# Patient Record
Sex: MALE | Race: WHITE | NOT HISPANIC OR LATINO | Employment: FULL TIME | URBAN - METROPOLITAN AREA
[De-identification: names, ages, dates, MRNs, and addresses within clinical notes are randomized per-mention and may not be internally consistent; named-entity substitution may affect disease eponyms.]

---

## 2018-12-19 ENCOUNTER — OCCUPATIONAL MEDICINE (OUTPATIENT)
Dept: URGENT CARE | Facility: PHYSICIAN GROUP | Age: 38
End: 2018-12-19
Payer: COMMERCIAL

## 2018-12-19 VITALS
BODY MASS INDEX: 28.38 KG/M2 | WEIGHT: 224 LBS | OXYGEN SATURATION: 98 % | DIASTOLIC BLOOD PRESSURE: 82 MMHG | RESPIRATION RATE: 16 BRPM | SYSTOLIC BLOOD PRESSURE: 134 MMHG | TEMPERATURE: 98 F | HEART RATE: 64 BPM

## 2018-12-19 DIAGNOSIS — S39.012A LOW BACK STRAIN, INITIAL ENCOUNTER: ICD-10-CM

## 2018-12-19 DIAGNOSIS — M62.830 SPASM OF BACK MUSCLES: ICD-10-CM

## 2018-12-19 PROCEDURE — 99203 OFFICE O/P NEW LOW 30 MIN: CPT | Mod: 29 | Performed by: PHYSICIAN ASSISTANT

## 2018-12-19 RX ORDER — CYCLOBENZAPRINE HCL 10 MG
TABLET ORAL
Qty: 20 TAB | Refills: 0 | Status: SHIPPED | OUTPATIENT
Start: 2018-12-19

## 2018-12-19 ASSESSMENT — ENCOUNTER SYMPTOMS
BACK PAIN: 1
GASTROINTESTINAL NEGATIVE: 1
CONSTITUTIONAL NEGATIVE: 1
NEUROLOGICAL NEGATIVE: 1
RESPIRATORY NEGATIVE: 1
CARDIOVASCULAR NEGATIVE: 1

## 2018-12-19 NOTE — PROGRESS NOTES
Subjective:      Jm Oglesby is a 38 y.o. male who presents with Back Pain (WC lower back injury today,back spasm)      DOI: 12/19/18, 1140.  Lower back.   Patient was bent down to  an empty box and as he was extending upwards he felt a sudden dull pain that has progressed to aching and spasms.  He states he can stand up straight but the pain gets exacerbated by extending the back.  He took about 400 mg of Ibuprofen immediately after with mild improvement.  No numbness, tingling or weakness in the legs.   No bladder or bowel changes.   Patient does not have chronic back issues but does state that he had a similar episode of back spasms that seemed to have no proceeding issue or causation in the Fall that was not work related.  No previous back injuries or surgeries.  No second job.      Back Pain     as above.     Review of Systems   Constitutional: Negative.    Respiratory: Negative.    Cardiovascular: Negative.    Gastrointestinal: Negative.    Genitourinary: Negative.    Musculoskeletal: Positive for back pain.        SEE HPI   Skin: Negative.    Neurological: Negative.        PMH:  has a past medical history of Hypertension.  MEDS:   Current Outpatient Prescriptions:   •  cyclobenzaprine (FLEXERIL) 10 MG Tab, 1/2 -1 tablet at bedtime prn spasms., Disp: 20 Tab, Rfl: 0  ALLERGIES:   Allergies   Allergen Reactions   • Amoxicillin    • Vicodin [Hydrocodone-Acetaminophen] Nausea     Nausea,vomiting,itching     SURGHX:   Past Surgical History:   Procedure Laterality Date   • OTHER      orbital plate r/t trauma /wrist   • OTHER ORTHOPEDIC SURGERY       SOCHX:  reports that he has never smoked. He has never used smokeless tobacco. He reports that he drinks alcohol. He reports that he uses drugs.  FH: Family history was reviewed, no pertinent findings to report   Objective:     /82 (BP Location: Left arm, Patient Position: Sitting, BP Cuff Size: Adult)   Pulse 64   Temp 36.7 °C (98 °F)   Resp  16   Wt 101.6 kg (224 lb)   SpO2 98%   BMI 28.38 kg/m²      Physical Exam   Constitutional: He is oriented to person, place, and time. He appears well-developed and well-nourished. No distress.   Neck: Normal range of motion. Neck supple.   Cardiovascular: Normal rate.    Pulmonary/Chest: Effort normal.   Neurological: He is alert and oriented to person, place, and time.   Skin: Skin is warm and dry. No rash noted.   Psychiatric: He has a normal mood and affect. His behavior is normal.     Vitals reviewed   Lumbar spine: No swelling, ecchymosis or deformity.  No midline TTP.  Mild bilateral paraspinous TTP mid-lower lumbar region, spasm noted b/l.  Decreased ROM of back by 50% in all planes, pain with full flexion and extension. Bilateral lower extremities with 5/5 strength, normal sensation throughout and 2+ DTRs.  Normal gait.          Assessment/Plan:     1. Low back strain, initial encounter     2. Spasm of back muscles  cyclobenzaprine (FLEXERIL) 10 MG Tab       -restrictions as above.   -back care discussed, proper lifting mechanics discussed  -recommend heat/ice prn.  Gentle stretches daily.   -Flexeril if needed for bedtime/spasms. No driving, caution drowsy.  Not to be taken while at work.   -back pain red flags and ER precautions discussed with patient.       Anahi Aragon P.A.-C.

## 2018-12-19 NOTE — LETTER
[facility] [address] [address]  Phone:  [phone] - Fax:  [phone]   [facility] Progress Report and Disability Certification  Date of Service: 12/19/2018   No Show:  No  Date / Time of Next Visit: 12/22/2018   Claim Information   Patient Name: Jm Oglesby  Claim Number:     Employer: [employer]  Date of Injury: 12/19/2018     Insurer / TPA: Mark Dubois  ID / SSN:     Occupation:   Diagnosis: Diagnoses of Low back strain, initial encounter and Spasm of back muscles were pertinent to this visit.    Medical Information   Related to Industrial Injury? Yes    Subjective Complaints:  DOI: 12/19/18, 1140.  Lower back.   Patient was bent down to  an empty box and as he was extending upwards he felt a sudden dull pain that has progressed to aching and spasms.  He states he can stand up straight but the pain gets exacerbated by extending the back.  He took about 400 mg of Ibuprofen immediately after with mild improvement.  No numbness, tingling or weakness in the legs.   No bladder or bowel changes.   Patient does not have chronic back issues but does state that he had a similar episode of back spasms that seemed to have no proceeding issue or causation in the Fall that was not work related.  No previous back injuries or surgeries.  No second job.    Objective Findings: Vitals reviewed   Lumbar spine: No swelling, ecchymosis or deformity.  No midline TTP.  Mild bilateral paraspinous TTP mid-lower lumbar region, spasm noted b/l.  Decreased ROM of back by 50% in all planes, pain with full flexion and extension. Bilateral lower extremities with 5/5 strength, normal sensation throughout and 2+ DTRs.  Normal gait.      Pre-Existing Condition(s):     Assessment:   Initial Visit    Status: Additional Care Required  Permanent Disability:No    Plan:      Diagnostics:      Comments:       Disability  Information   Status: Released to Restricted Duty    From:  2018  Through: 2018 Restrictions are: Temporary   Physical Restrictions   Sitting:    Standing:    Stooping:    Bendin hrs/day   Squatting:    Walking:    Climbin hrs/day Pushing:      Pulling:    Other:    Reaching Above Shoulder (L):   Reaching Above Shoulder (R):       Reaching Below Shoulder (L):    Reaching Below Shoulder (R):      Not to exceed Weight Limits   Carrying(hrs):   Weight Limit(lb): < or = to 10 pounds Lifting(hrs):   Weight  Limit(lb): < or = to 10 pounds   Comments: -restrictions as above.   -back care discussed, proper lifting mechanics discussed  -recommend heat/ice prn.  Gentle stretches daily.   -Flexeril if needed for bedtime/spasms. No driving, caution drowsy.  Not to be taken while at work.   -back pain red flags and ER precautions discussed with patient.     Repetitive Actions   Hands: i.e. Fine Manipulations from Grasping:     Feet: i.e. Operating Foot Controls:     Driving / Operate Machinery:     Physician Name: Rosana Aragon P.A.-C. Physician Signature: ROSANA Monge P.A.-C. e-Signature: Dr. Jonathan Olivas, Medical Director   Clinic Name / Location: [address] [address] Clinic Phone Number: Dept: [phone]   Appointment Time: 1:20 Pm Visit Start Time: 1:38 PM   Check-In Time:  1:32 Pm Visit Discharge Time:  245PM   Original-Treating Physician or Chiropractor    Page 2-Insurer/TPA    Page 3-Employer    Page 4-Employee

## 2018-12-19 NOTE — LETTER
"EMPLOYEE’S CLAIM FOR COMPENSATION/ REPORT OF INITIAL TREATMENT  FORM C-4    EMPLOYEE’S CLAIM - PROVIDE ALL INFORMATION REQUESTED   First Name  Jm Last Name  Mendel Birthdate                    1980                Sex  male Claim Number   Home Address  [address] Age  38 y.o. Height  6'2.5\" Weight  101.6 kg (224 lb) [facility]     [place]  [address] Telephone  [phone] (home)    Mailing Address  [address] [place]  [address] Primary Language Spoken  English    Insurer   Third Party   Mark Corrigan   Employee's Occupation (Job Title) When Injury or Occupational Disease Occurred      Employer's Name  [employer]  Telephone  [phone]    Employer Address  [address]  [address]    Date of Injury  12/19/2018               Hour of Injury  11:40 AM Date Employer Notified  12/19/2018 Last Day of Work after Injury or Occupational Disease   Supervisor to Whom Injury Reported  [facility]   Address or Location of Accident (if applicable)  [[address] [place] [address]]   What were you doing at the time of accident? (if applicable)  Picking up an empty box    How did this injury or occupational disease occur? (Be specific an answer in detail. Use additional sheet if necessary)  I picked up an empty box from the floor   If you believe that you have an occupational disease, when did you first have knowledge of the disability and it relationship to your employment?   Witnesses to the Accident        Nature of Injury or Occupational Disease  Strain  Part(s) of Body Injured or Affected  Lower Back Area (Lumbar Area & Lumbo-Sacral), ,     I certify that the above is true and correct to the best of my knowledge and that I have provided this information in order to obtain the benefits of [place]’s [place] Insurance and " [place]  I hereby authorize any physician, chiropractor, surgeon, practitioner, or other person, any hospital, including [facility] or [facility], any medical service organization, any insurance company, or other institution or organization to release to each other, any medical or other information, including benefits paid or payable, pertinent to this injury or disease, except information relative to diagnosis, treatment and/or counseling for AIDS, psychological conditions, alcohol or controlled substances, for which I must give specific authorization.  A Photostat of this authorization shall be as valid as the original.     Date   Place   Employee’s Signature   THIS REPORT MUST BE COMPLETED AND MAILED WITHIN 3 WORKING DAYS OF TREATMENT   Place  [facility]  Name of Facility  [place]   Date  12/19/2018 Diagnosis  (S39.012A) Low back strain, initial encounter  (M62.830) Spasm of back muscles Is there evidence the injured employee was under the influence of alcohol and/or another controlled substance at the time of accident?   Hour  1:38 PM Description of Injury or Disease  Diagnoses of Low back strain, initial encounter and Spasm of back muscles were pertinent to this visit. No   Treatment  -restrictions as above.   -back care discussed, proper lifting mechanics discussed  -recommend heat/ice prn.  Gentle stretches daily.   -Flexeril if needed for bedtime/spasms. No driving, caution drowsy.  Not to be taken while at work.   -back pain red flags and ER precautions discussed with patient.   Have you advised the patient to remain off work five days or more? No   X-Ray Findings      If Yes   From Date  To Date      From information given by the employee, together with medical evidence, can you directly connect this injury or occupational disease as job incurred?  Yes If No Full Duty  No Modified Duty  Yes    "  Is additional medical care by a physician indicated?  Yes If Modified Duty, Specify any Limitations / Restrictions      Do you know of any previous injury or disease contributing to this condition or occupational disease?                            No   Date  12/19/2018 Print Doctor’s Name Rosana Aragon P.A.-C. I certify the employer’s copy of  this form was mailed on:   Address  [address] Insurer’s Use Only     [facility]  [address]    Provider’s Tax ID Number  [id number] Telephone  Dept: [phone]        e-ROSANA Mcintyre P.A.-C.   e-Signature: Dr. Jonathan Olivas, Medical Director Degree  JOS        ORIGINAL-TREATING PHYSICIAN OR CHIROPRACTOR    PAGE 2-INSURER/TPA    PAGE 3-EMPLOYER    PAGE 4-EMPLOYEE             Form C-4 (rev10/07)              BRIEF DESCRIPTION OF RIGHTS AND BENEFITS  (Pursuant to [place]RS 616C.050)    Notice of Injury or Occupational Disease (Incident Report Form C-1): If an injury or occupational disease (OD) arises out of and in the  course of employment, you must provide written notice to your employer as soon as practicable, but no later than 7 days after the accident or  OD. Your employer shall maintain a sufficient supply of the required forms.    Claim for Compensation (Form C-4): If medical treatment is sought, the form C-4 is available at the place of initial treatment. A completed  \"Claim for Compensation\" (Form C-4) must be filed within 90 days after an accident or OD. The treating physician or chiropractor must,  within 3 working days after treatment, complete and mail to the employer, the employer's insurer and third-party , the Claim for  Compensation.    Medical Treatment: If you require medical treatment for your on-the-job injury or OD, you may be required to select a physician or  chiropractor from a list provided by your workers’ compensation insurer, if it has contracted with an Organization for Managed Care (MCO) " or  Preferred Provider Organization (PPO) or providers of health care. If your employer has not entered into a contract with an MCO or PPO, you  may select a physician or chiropractor from the Panel of Physicians and Chiropractors. Any medical costs related to your industrial injury or  OD will be paid by your insurer.    Temporary Total Disability (TTD): If your doctor has certified that you are unable to work for a period of at least 5 consecutive days, or 5  cumulative days in a 20-day period, or places restrictions on you that your employer does not accommodate, you may be entitled to TTD  compensation.    Temporary Partial Disability (TPD): If the wage you receive upon reemployment is less than the compensation for TTD to which you are  entitled, the insurer may be required to pay you TPD compensation to make up the difference. TPD can only be paid for a maximum of 24  months.    Permanent Partial Disability (PPD): When your medical condition is stable and there is an indication of a PPD as a result of your injury or  OD, within 30 days, your insurer must arrange for an evaluation by a rating physician or chiropractor to determine the degree of your PPD. The  amount of your PPD award depends on the date of injury, the results of the PPD evaluation and your age and wage.    Permanent Total Disability (PTD): If you are medically certified by a treating physician or chiropractor as permanently and totally disabled  and have been granted a PTD status by your insurer, you are entitled to receive monthly benefits not to exceed 66 2/3% of your average  monthly wage. The amount of your PTD payments is subject to reduction if you previously received a PPD award.    Vocational Rehabilitation Services: You may be eligible for vocational rehabilitation services if you are unable to return to the job due to a  permanent physical impairment or permanent restrictions as a result of your injury or occupational  disease.    Transportation and Per Ana Reimbursement: You may be eligible for travel expenses and per ana associated with medical treatment.    Reopening: You may be able to reopen your claim if your condition worsens after claim closure.    Appeal Process: If you disagree with a written determination issued by the insurer or the insurer does not respond to your request, you may  appeal to the Department of Administration, , by following the instructions contained in your determination letter. You must  appeal the determination within 70 days from the date of the determination letter at [address], [address], [place], [place]  [address], or [address], [address], [place], [place] [address]. If you disagree with the  decision, you may appeal to the  Department of Administration, . You must file your appeal within 30 days from the date of the  decision  letter at [address], [address], [place], [place] [address], or [address], [place], [place] [address]. If you  disagree with a decision of an , you may file a petition for judicial review with the District Court. You must do so within 30  days of the Appeal Officer’s decision. You may be represented by an  at your own expense or you may contact the [facility] for possible  representation.    [place]  for Injured Workers (NAIW): If you disagree with a  decision, you may request that NAIW represent you  without charge at an  Hearing. For information regarding denial of benefits, you may contact the [facility] at: [address], [address], [place] [address], [phone], or [address], [place] [address], [phone]    To File a Complaint with the Division: If you wish to file a complaint with the  of the Division of Industrial Relations (DIR),  please contact  the Workers’ Compensation Section, [address], [address], [place], [place] [address], telephone (762) [phone], or  [address], [address], [place], [place] [address], telephone (631) [phone].    For assistance with Workers’ Compensation Issues: you may contact the Office of the [facility] Consumer Health Assistance, [address] [place] [address], [address], [place], [place] [address], Toll Free 1-843.571.6531, Web site: http://govcha.[facility].[place]., E-mail  [email or web address].[place].                                                                                                                                                                                                                                   __________________________________________________________________                                                                   _________________                Employee Name / Signature                                                                                                                                                       Date                                                                                                                                                                                                     D-2 (rev. 10/07)

## 2018-12-22 ENCOUNTER — OCCUPATIONAL MEDICINE (OUTPATIENT)
Dept: URGENT CARE | Facility: PHYSICIAN GROUP | Age: 38
End: 2018-12-22
Payer: COMMERCIAL

## 2018-12-22 VITALS
OXYGEN SATURATION: 98 % | RESPIRATION RATE: 16 BRPM | WEIGHT: 230 LBS | SYSTOLIC BLOOD PRESSURE: 132 MMHG | HEART RATE: 79 BPM | BODY MASS INDEX: 29.14 KG/M2 | DIASTOLIC BLOOD PRESSURE: 84 MMHG | TEMPERATURE: 98.2 F

## 2018-12-22 DIAGNOSIS — S39.012D STRAIN OF LUMBAR REGION, SUBSEQUENT ENCOUNTER: ICD-10-CM

## 2018-12-22 PROCEDURE — 99213 OFFICE O/P EST LOW 20 MIN: CPT | Performed by: FAMILY MEDICINE

## 2018-12-22 ASSESSMENT — ENCOUNTER SYMPTOMS
NECK PAIN: 0
SENSORY CHANGE: 0
FOCAL WEAKNESS: 0
FLANK PAIN: 0

## 2018-12-22 NOTE — LETTER
[facility] [address] [address]  Phone:  [phone] - Fax:  [phone]   [facility] Progress Report and Disability Certification  Date of Service: 12/22/2018   No Show:  No  Date / Time of Next Visit: 12/25/2018   Claim Information   Patient Name: Jm Oglesby  Claim Number:     Employer: [employer]  Date of Injury: 12/19/2018     Insurer / TPA: Mark Desdemona  ID / SSN:     Occupation:   Diagnosis: The encounter diagnosis was Strain of lumbar region, subsequent encounter.    Medical Information   Related to Industrial Injury? Yes    Subjective Complaints:  DOI 12/19/2018  F/u lumbar strain. Improving and feels 80% better. He continues to use flexeril at night and prn nsaid/heat but still has some stiffness in low back bilateral. No radiation. No myelopathy. No other aggravating or alleviating factors.     Objective Findings: Back: no point bony tenderness or stepoff, mild paralumbar muscle tenderness. Range of motion intact.   Neuro: Bilateral lower extremity strength and sensory intact.  Negative straight leg raise.  DTR 2+/4 equal at knees   Pre-Existing Condition(s):     Assessment:   Condition Improved    Status: Additional Care Required  Permanent Disability:No    Plan:   Comments:advance work restrictions, otc nsaid as needed    Diagnostics:      Comments:       Disability Information   Status: Released to Restricted Duty    From:  12/22/2018  Through: 12/25/2018 Restrictions are:     Physical Restrictions   Sitting:    Standing:    Stooping:  < or = to 2 hrs/day Bending:  < or = to 2 hrs/day   Squatting:    Walking:    Climbing:    Pushing:  < or = to 4 hrs/day   Pulling:  < or = to 4 hrs/day Other:    Reaching Above Shoulder (L):   Reaching Above Shoulder (R):       Reaching Below Shoulder (L):    Reaching Below Shoulder (R):      Not to exceed Weight Limits   Carrying(hrs): 4 Weight Limit(lb):    Comments:20 Lifting(hrs): 4 Weight  Limit(lb):   Comments:20   Comments:      Repetitive Actions   Hands: i.e. Fine Manipulations from Grasping:     Feet: i.e. Operating Foot Controls:     Driving / Operate Machinery:     Physician Name: Félix Pfeiffer M.D. Physician Signature: FÉLIX Leavitt M.D. e-Signature: Dr. Jonathan Olivas, Medical Director   Clinic Name / Location: [address] [address] Clinic Phone Number: Dept: [phone]   Appointment Time: 9:20 Am Visit Start Time: 9:30 AM   Check-In Time:  9:27 Am Visit Discharge Time:  9:54 AM   Original-Treating Physician or Chiropractor    Page 2-Insurer/TPA    Page 3-Employer    Page 4-Employee

## 2018-12-22 NOTE — PROGRESS NOTES
Subjective:      Jm Oglesby is a 38 y.o. male who presents with Back Pain (work comp follow up)      DOI 12/19/2018  F/u lumbar strain. Improving and feels 80% better. He continues to use flexeril at night and prn nsaid/heat but still has some stiffness in low back bilateral. No radiation. No myelopathy. No other aggravating or alleviating factors.       HPI    Review of Systems   Genitourinary: Negative for flank pain and hematuria.   Musculoskeletal: Negative for neck pain.   Skin: Negative for rash.   Neurological: Negative for sensory change and focal weakness.          Objective:     /84   Pulse 79   Temp 36.8 °C (98.2 °F)   Resp 16   Wt 104.3 kg (230 lb)   SpO2 98%   BMI 29.14 kg/m²      Physical Exam   Constitutional: He appears well-developed and well-nourished. No distress.   HENT:   Head: Normocephalic and atraumatic.   Skin: Skin is warm and dry. No rash noted.       Back: no point bony tenderness or stepoff, mild paralumbar muscle tenderness. Range of motion intact.   Neuro: Bilateral lower extremity strength and sensory intact.  Negative straight leg raise.  DTR 2+/4 equal at knees       Assessment/Plan:     1. Strain of lumbar region, subsequent encounter       Differential diagnosis, natural history, supportive care, and indications for immediate follow-up discussed at length.

## 2018-12-25 ENCOUNTER — OCCUPATIONAL MEDICINE (OUTPATIENT)
Dept: URGENT CARE | Facility: PHYSICIAN GROUP | Age: 38
End: 2018-12-25
Payer: COMMERCIAL

## 2018-12-25 VITALS
HEART RATE: 60 BPM | OXYGEN SATURATION: 100 % | SYSTOLIC BLOOD PRESSURE: 140 MMHG | BODY MASS INDEX: 29.9 KG/M2 | DIASTOLIC BLOOD PRESSURE: 88 MMHG | WEIGHT: 236 LBS | RESPIRATION RATE: 20 BRPM | TEMPERATURE: 98 F

## 2018-12-25 DIAGNOSIS — S39.012D STRAIN OF LUMBAR REGION, SUBSEQUENT ENCOUNTER: ICD-10-CM

## 2018-12-25 PROCEDURE — 99213 OFFICE O/P EST LOW 20 MIN: CPT | Mod: 29 | Performed by: NURSE PRACTITIONER

## 2018-12-25 ASSESSMENT — ENCOUNTER SYMPTOMS
ABDOMINAL PAIN: 0
FOCAL WEAKNESS: 0
SENSORY CHANGE: 0
BACK PAIN: 1
TINGLING: 0

## 2018-12-25 NOTE — PROGRESS NOTES
Subjective:      Jm Oglesby is a 38 y.o. male who presents with Low Back Pain ([facility], muscle strain )            HPI DOI: 12/19/18. Patient is here for follow up on low back strain. Today, patient states mostly stiff with mild pain with extreme range of motion. Still taking ibuprofen and flexeril. Aggravating: sudden movements. Alleviating : standing, stretching.  Amoxicillin and Vicodin [hydrocodone-acetaminophen]  Current Outpatient Prescriptions on File Prior to Visit   Medication Sig Dispense Refill   • cyclobenzaprine (FLEXERIL) 10 MG Tab 1/2 -1 tablet at bedtime prn spasms. 20 Tab 0     No current facility-administered medications on file prior to visit.      Social History     Social History   • Marital status: Single     Spouse name: N/A   • Number of children: N/A   • Years of education: N/A     Occupational History   • Not on file.     Social History Main Topics   • Smoking status: Never Smoker   • Smokeless tobacco: Never Used   • Alcohol use Yes      Comment: occ   • Drug use: Yes      Comment: rare Marijuana   • Sexual activity: Not on file     Other Topics Concern   • Not on file     Social History Narrative   • No narrative on file     family history is not on file.      Review of Systems   Gastrointestinal: Negative for abdominal pain.   Musculoskeletal: Positive for back pain.   Neurological: Negative for tingling, sensory change and focal weakness.          Objective:     /88   Pulse 60   Temp 36.7 °C (98 °F)   Resp 20   Wt 107 kg (236 lb)   SpO2 100%   BMI 29.90 kg/m²      Physical Exam   Constitutional: He appears well-developed and well-nourished. No distress.   Cardiovascular: Normal rate, regular rhythm and normal heart sounds.    No murmur heard.  Pulmonary/Chest: Effort normal and breath sounds normal.   Musculoskeletal:        Lumbar back: He exhibits tenderness and pain. He exhibits normal range of motion, no swelling and no spasm.   Neurological: He is alert. No  sensory deficit. He exhibits normal muscle tone. Gait normal.               Assessment/Plan:     1. Strain of lumbar region, subsequent encounter       Improving slowly.  Continue plan of care.  Follow up on Saturday.

## 2018-12-25 NOTE — LETTER
[facility] [address] [address]  Phone:  [phone] - Fax:  [phone]   [facility] Progress Report and Disability Certification  Date of Service: 12/25/2018   No Show:  No  Date / Time of Next Visit: 12/29/2018   Claim Information   Patient Name: Jm Oglesby  Claim Number:     Employer: [employer]  Date of Injury: 12/19/2018     Insurer / TPA: [employer] [place]  ID / SSN:     Occupation:   Diagnosis: The encounter diagnosis was Strain of lumbar region, subsequent encounter.    Medical Information   Related to Industrial Injury? Yes    Subjective Complaints:  DOI: 12/19/18. Patient is here for follow up on low back strain. Today, patient states mostly stiff with mild pain with extreme range of motion. Still taking ibuprofen and flexeril. Aggravating: sudden movements. Alleviating : standing, stretching.   Objective Findings: Physical Exam   Constitutional: He appears well-developed and well-nourished. No distress.   Cardiovascular: Normal rate, regular rhythm and normal heart sounds.    No murmur heard.  Pulmonary/Chest: Effort normal and breath sounds normal.   Musculoskeletal:        Lumbar back: He exhibits tenderness and pain. He exhibits normal range of motion, no swelling and no spasm.   Neurological: He is alert. No sensory deficit. He exhibits normal muscle tone. Gait normal.      Pre-Existing Condition(s):     Assessment:   Condition Improved    Status: Additional Care Required  Permanent Disability:No    Plan:      Diagnostics:      Comments:       Disability Information   Status: Released to Restricted Duty    From:  12/25/2018  Through: 12/29/2018 Restrictions are: Temporary   Physical Restrictions   Sitting:    Standing:    Stooping:  < or = to 4 hrs/day Bending:  < or = to 4 hrs/day   Squatting:  < or = to 4 hrs/day Walking:    Climbing:    Pushing:      Pulling:    Other:    Reaching Above  Shoulder (L):   Reaching Above Shoulder (R):       Reaching Below Shoulder (L):    Reaching Below Shoulder (R):      Not to exceed Weight Limits   Carrying(hrs):   Weight Limit(lb): < or = to 25 pounds Lifting(hrs):   Weight  Limit(lb): < or = to 25 pounds   Comments: F/u. 12/29/18.    Repetitive Actions   Hands: i.e. Fine Manipulations from Grasping:     Feet: i.e. Operating Foot Controls:     Driving / Operate Machinery:     Physician Name: ARISTIDES GalindoPLUCY Physician Signature: MICHAEL Canchola e-Signature: Dr. Jonathan Olivas, Medical Director   Clinic Name / Location: [address] [address] Clinic Phone Number: Dept: [phone]   Appointment Time: 9:20 Am Visit Start Time: 9:24 AM   Check-In Time:  9:12 Am Visit Discharge Time:  9:41AM   Original-Treating Physician or Chiropractor    Page 2-Insurer/TPA    Page 3-Employer    Page 4-Employee

## 2018-12-29 ENCOUNTER — OCCUPATIONAL MEDICINE (OUTPATIENT)
Dept: URGENT CARE | Facility: PHYSICIAN GROUP | Age: 38
End: 2018-12-29
Payer: COMMERCIAL

## 2018-12-29 VITALS
HEART RATE: 76 BPM | OXYGEN SATURATION: 99 % | RESPIRATION RATE: 18 BRPM | HEIGHT: 74 IN | TEMPERATURE: 99.3 F | BODY MASS INDEX: 28.75 KG/M2 | DIASTOLIC BLOOD PRESSURE: 76 MMHG | WEIGHT: 224 LBS | SYSTOLIC BLOOD PRESSURE: 142 MMHG

## 2018-12-29 DIAGNOSIS — S39.012D STRAIN OF LUMBAR REGION, SUBSEQUENT ENCOUNTER: ICD-10-CM

## 2018-12-29 PROCEDURE — 99213 OFFICE O/P EST LOW 20 MIN: CPT | Mod: 29 | Performed by: NURSE PRACTITIONER

## 2018-12-29 ASSESSMENT — ENCOUNTER SYMPTOMS
NEUROLOGICAL NEGATIVE: 1
CONSTITUTIONAL NEGATIVE: 1
PSYCHIATRIC NEGATIVE: 1
CARDIOVASCULAR NEGATIVE: 1
BACK PAIN: 1
GASTROINTESTINAL NEGATIVE: 1
RESPIRATORY NEGATIVE: 1

## 2018-12-29 NOTE — PROGRESS NOTES
"Subjective:      Jm Oglesby is a 38 y.o. male who presents with Work-Related Injury (lower back injury)    HPI  HPI DOI: 12/19/18  Patient developed low back pain after bending over to  a light box, he immediately developed pain to his low back, bilateral.  Patient is here for follow up on low back strain. Today, patient states mostly stiff with mild pain with extreme range of motion. Still taking ibuprofen and flexeril. Aggravating: sudden movements. Alleviating : standing, stretching. He has been tolerating work restrictions well. He slowly admits to improvement but does not feel he is ready to return fully to work.     Past Medical History:   Diagnosis Date   • Hypertension     Borderline no meds     Past Surgical History:   Procedure Laterality Date   • OTHER      orbital plate r/t trauma /wrist   • OTHER ORTHOPEDIC SURGERY       Current Outpatient Prescriptions on File Prior to Visit   Medication Sig Dispense Refill   • cyclobenzaprine (FLEXERIL) 10 MG Tab 1/2 -1 tablet at bedtime prn spasms. 20 Tab 0     No current facility-administered medications on file prior to visit.      ALL: Amoxicillin and Vicodin [hydrocodone-acetaminophen]    Review of Systems   Constitutional: Negative.    HENT: Negative.    Respiratory: Negative.    Cardiovascular: Negative.    Gastrointestinal: Negative.    Genitourinary: Negative.    Musculoskeletal: Positive for back pain.   Skin: Negative.    Neurological: Negative.    Psychiatric/Behavioral: Negative.           Objective:     /76 (BP Location: Right arm, Patient Position: Sitting, BP Cuff Size: Adult)   Pulse 76   Temp 37.4 °C (99.3 °F) (Temporal)   Resp 18   Ht 1.88 m (6' 2\")   Wt 101.6 kg (224 lb)   SpO2 99%   BMI 28.76 kg/m²      Physical Exam   Constitutional: He is oriented to person, place, and time. Vital signs are normal. He appears well-developed and well-nourished. He is active. He does not have a sickly appearance. He does not appear " ill. No distress.   HENT:   Head: Normocephalic and atraumatic.   Right Ear: External ear normal.   Left Ear: External ear normal.   Nose: Nose normal.   Mouth/Throat: Oropharynx is clear and moist.   Eyes: Pupils are equal, round, and reactive to light. Conjunctivae are normal. Right eye exhibits no discharge. Left eye exhibits no discharge. No scleral icterus.   Neck: Normal range of motion. Neck supple. No JVD present. No tracheal deviation present.   Cardiovascular: Normal rate, regular rhythm, normal heart sounds and intact distal pulses.    Pulmonary/Chest: Effort normal and breath sounds normal. No stridor. No respiratory distress. He has no wheezes.   Musculoskeletal: Normal range of motion. He exhibits no edema, tenderness or deformity.        Cervical back: Normal.        Thoracic back: Normal.        Lumbar back: Normal.   Lymphadenopathy:     He has no cervical adenopathy.   Neurological: He is alert and oriented to person, place, and time. He has normal strength and normal reflexes. He displays normal reflexes. No cranial nerve deficit or sensory deficit. He exhibits normal muscle tone. Coordination and gait normal. GCS eye subscore is 4. GCS verbal subscore is 5. GCS motor subscore is 6.   Skin: Skin is warm, dry and intact. Capillary refill takes less than 2 seconds. No abrasion, no bruising, no ecchymosis, no laceration and no rash noted. He is not diaphoretic. No erythema. No pallor.   Psychiatric: He has a normal mood and affect. His behavior is normal. Judgment and thought content normal.   Vitals reviewed.      A/Ox4. NAD. Gait is steady. There is no spinal tenderness, spine has FROM. Patellar DTRs +2 bilaterally, strength 5/5 with upper and lower extremities. N/V intact.        Assessment/Plan:     1. Strain of lumbar region, subsequent encounter      Patient appears to be healing well, OTC NSAIDS, Flexeril (not at work), ice and heat therapy, work restrictions (discussed full duty trial during  next visit), RTC 1/2/18. Supportive care, differential diagnoses, and indications for immediate follow-up discussed with patient.   Pathogenesis of diagnosis discussed including typical length and natural progression.   Instructed to return to clinic or nearest emergency department sooner for any change in condition, further concerns, or worsening of symptoms.  Patient states understanding of the plan of care and discharge instructions.          ARACELI Murguia.

## 2018-12-29 NOTE — LETTER
[facility] [address] [place] [address]  Phone:  [phone] - Fax:  [phone]   [facility] Progress Report and Disability Certification  Date of Service: 2018   No Show:  No  Date / Time of Next Visit: 2019   Claim Information   Patient Name: Jm Oglesby  Claim Number:     Employer: [employer]  Date of Injury: 2018     Insurer / TPA: Mark [place]  ID / SSN:     Occupation:   Diagnosis: The encounter diagnosis was Strain of lumbar region, subsequent encounter.    Medical Information   Related to Industrial Injury? Yes    Subjective Complaints:  HPI DOI: 18  Patient developed low back pain after bending over to  a light box, he immediately developed pain to his low back, bilateral.  Patient is here for follow up on low back strain. Today, patient states mostly stiff with mild pain with extreme range of motion. Still taking ibuprofen and flexeril. Aggravating: sudden movements. Alleviating : standing, stretching. He has been tolerating work restrictions well. He slowly admits to improvement but does not feel he is ready to return fully to work.    Objective Findings: A/Ox4. NAD. Gait is steady. There is no spinal tenderness, spine has FROM. Patellar DTRs +2 bilaterally, strength 5/5 with upper and lower extremities. N/V intact.    Pre-Existing Condition(s):     Assessment:   Condition Improved    Status: Additional Care Required  Permanent Disability:No    Plan: MedicationMedication (NOT at Work)  Comments:OTC NSAIDS, Flexeril (not at work), ice and heat therapy, work restrictions, RTC 18     Diagnostics:   Comments:N/A    Comments:       Disability Information   Status: Released to Restricted Duty    From:  2018  Through: 2019 Restrictions are: Temporary   Physical Restrictions   Sitting:    Standing:  < or = to 6 hrs/day Stooping:    Bendin hrs/day    Squattin hrs/day Walking:    Climbing:    Pushing:  < or = to 2 hrs/day   Pulling:  < or = to 2 hrs/day Other:    Reaching Above Shoulder (L):   Reaching Above Shoulder (R):       Reaching Below Shoulder (L):    Reaching Below Shoulder (R):      Not to exceed Weight Limits   Carrying(hrs):   Weight Limit(lb): < or = to 25 pounds Lifting(hrs):   Weight  Limit(lb): < or = to 25 pounds   Comments:      Repetitive Actions   Hands: i.e. Fine Manipulations from Grasping:     Feet: i.e. Operating Foot Controls:     Driving / Operate Machinery:     Physician Name: JUAN J Murguia Physician Signature: SALINAS Dey e-Signature: Dr. Jonathan Olivas, Medical Director   Clinic Name / Location: [address] [address] Clinic Phone Number: Dept: [phone]   Appointment Time: 9:20 Am Visit Start Time: 9:16 AM   Check-In Time:  9:07 Am Visit Discharge Time: 9:41 AM   Original-Treating Physician or Chiropractor    Page 2-Insurer/TPA    Page 3-Employer    Page 4-Employee

## 2019-01-02 ENCOUNTER — OCCUPATIONAL MEDICINE (OUTPATIENT)
Dept: URGENT CARE | Facility: PHYSICIAN GROUP | Age: 39
End: 2019-01-02
Payer: COMMERCIAL

## 2019-01-02 ENCOUNTER — HOSPITAL ENCOUNTER (OUTPATIENT)
Dept: RADIOLOGY | Facility: MEDICAL CENTER | Age: 39
End: 2019-01-02
Attending: FAMILY MEDICINE
Payer: COMMERCIAL

## 2019-01-02 VITALS
SYSTOLIC BLOOD PRESSURE: 124 MMHG | RESPIRATION RATE: 16 BRPM | TEMPERATURE: 98.2 F | WEIGHT: 237 LBS | BODY MASS INDEX: 30.42 KG/M2 | DIASTOLIC BLOOD PRESSURE: 66 MMHG | OXYGEN SATURATION: 100 % | HEART RATE: 75 BPM | HEIGHT: 74 IN

## 2019-01-02 DIAGNOSIS — S39.012A LOW BACK STRAIN, INITIAL ENCOUNTER: ICD-10-CM

## 2019-01-02 PROCEDURE — 99214 OFFICE O/P EST MOD 30 MIN: CPT | Performed by: FAMILY MEDICINE

## 2019-01-02 PROCEDURE — 72100 X-RAY EXAM L-S SPINE 2/3 VWS: CPT

## 2019-01-02 RX ORDER — CYCLOBENZAPRINE HCL 10 MG
10 TABLET ORAL NIGHTLY PRN
Qty: 20 TAB | Refills: 0 | Status: SHIPPED | OUTPATIENT
Start: 2019-01-02

## 2019-01-02 NOTE — PROGRESS NOTES
"Subjective:      Jm Oglesby is a 38 y.o. male who presents with Back Injury (/[facility] low back injury)      DOI: 12/19/2018  He si here for follow up. Feels better but continue to feel tight in the lower back, no paresthesias or muscle weakness. Still taking Cyclobenzaprine at night. Working 8 hours but continues to have light duty. Taking ibuprofen 400 dieter 6 hours as needed but has not taken any in the past 2 days   He has only a few muscle relaxer left     HPI    ROS       Objective:     /66 (BP Location: Left arm, Patient Position: Sitting, BP Cuff Size: Adult)   Pulse 75   Temp 36.8 °C (98.2 °F) (Temporal)   Resp 16   Ht 1.88 m (6' 2\")   Wt 107.5 kg (237 lb)   SpO2 100%   BMI 30.43 kg/m²      Physical Exam    Physical Exam   Constitutional: He is oriented to person, place, and time. He appears well-developed and well-nourished. No distress.   Cardiovascular: Normal rate.    Pulmonary/Chest: Effort normal. No respiratory distress.   Musculoskeletal:        Lumbar back: He exhibits decreased range of motion (decrease in extension) and tenderness (tenderness in the paraspina;l region in the lower lumbar area). He exhibits no swelling, no edema, no deformity and normal pulse.   Neurological: He is alert and oriented to person, place, and time. He has normal strength. He displays normal reflexes.   Negative SLR bilat   Skin: He is not diaphoretic.     Lumbar x-ray negative for acute abnormalities on my read       Assessment/Plan:     1. Low back strain, initial encounter  - DX-LUMBAR SPINE-2 OR 3 VIEWS; Future  - REFERRAL TO PHYSICAL THERAPY Reason for Therapy: Eval/Treat/Report  - REFERRAL TO OCCUPATIONAL MEDICINE  - cyclobenzaprine (FLEXERIL) 10 MG Tab; Take 1 Tab by mouth at bedtime as needed.  Dispense: 20 Tab; Refill: 0    Plan to refill the muscle relaxer for as needed at night use  We discussed the proper dose for ibuprofen which is 600-800 mg every 8 hours as needed for pain  Continue " work restrictions  Referral to physical therapy  Also referral to occupational health but we discussed that this may take 2-4 weeks to get in  Patient was advised to follow-up here by latest January 12, unless he is able to see occupational health in which case we are transferring care to them

## 2019-01-02 NOTE — PROGRESS NOTES
Physical Exam   Constitutional: He is oriented to person, place, and time. He appears well-developed and well-nourished. No distress.   Cardiovascular: Normal rate.    Pulmonary/Chest: Effort normal. No respiratory distress.   Musculoskeletal:        Lumbar back: He exhibits decreased range of motion (decrease in extension) and tenderness (tenderness in the paraspina;l region in the lower lumbar area). He exhibits no swelling, no edema, no deformity and normal pulse.        Back:    Neurological: He is alert and oriented to person, place, and time. He has normal strength. He displays normal reflexes.   Negative SLR bilat   Skin: He is not diaphoretic.

## 2019-01-02 NOTE — LETTER
[facility] [address] [address]  Phone:  [phone] - Fax:  [phone]   [facility] Progress Report and Disability Certification  Date of Service: 1/2/2019   No Show:  No  Date / Time of Next Visit: 1/12/2019   Claim Information   Patient Name: Jm Oglesby  Claim Number:     Employer: [employer]  Date of Injury: 12/19/2018     Insurer / TPA: Mark [place]  ID / SSN:     Occupation:   Diagnosis: The encounter diagnosis was Low back strain, initial encounter.    Medical Information   Related to Industrial Injury? Yes    Subjective Complaints:  DOI: 12/19/2018  He si here for follow up. Feels better but continue to feel tight in the lower back, no paresthesias or muscle weakness. Still taking Cyclobenzaprine at night. Working 8 hours but continues to have light duty. Taking ibuprofen 400 dieter 6 hours as needed but has not taken any in the past 2 days   Objective Findings: Physical Exam   Constitutional: He is oriented to person, place, and time. He appears well-developed and well-nourished. No distress.   Cardiovascular: Normal rate.    Pulmonary/Chest: Effort normal. No respiratory distress.   Musculoskeletal:        Lumbar back: He exhibits decreased range of motion (decrease in extension) and tenderness (tenderness in the paraspina;l region in the lower lumbar area). He exhibits no swelling, no edema, no deformity and normal pulse.   Neurological: He is alert and oriented to person, place, and time. He has normal strength. He displays normal reflexes.   Negative SLR bilat   Skin: He is not diaphoretic.      Pre-Existing Condition(s):     Assessment:   Initial Visit    Status: Additional Care Required  Permanent Disability:No    Plan: PTTransfer Care  Comments:usew ibuprofen 600-800mg every 8 hours as needed for pain    Diagnostics: X-ray    Comments:       Disability Information   Status: Released to  Restricted Duty    From:  1/2/2019  Through: 1/12/2019 Restrictions are: Temporary   Physical Restrictions   Sitting:    Standing:    Stooping:    Bending:      Squatting:    Walking:    Climbing:    Pushing:      Pulling:    Other:    Reaching Above Shoulder (L):   Reaching Above Shoulder (R):       Reaching Below Shoulder (L):    Reaching Below Shoulder (R):      Not to exceed Weight Limits   Carrying(hrs):   Weight Limit(lb):   Lifting(hrs):   Weight  Limit(lb):     Comments: No lifting more than 5 lbs  Continue restriction  Use Ibuprofen as needed  Muscle relaxer only at night if needed  Referral to Physical therapy  Also referral to Occupational Health- May take 2-4 weeks    Repetitive Actions   Hands: i.e. Fine Manipulations from Grasping:     Feet: i.e. Operating Foot Controls:     Driving / Operate Machinery:     Physician Name: Jenn Cm M.D. Physician Signature: JENN Green M.D. e-Signature: Dr. Jonathan Olivas, Medical Director   Clinic Name / Location: [address] [address] Clinic Phone Number: Dept: [phone]   Appointment Time: 9:00 Am Visit Start Time: 9:11 AM   Check-In Time:  9:01 Am Visit Discharge Time:  10:12 AM   Original-Treating Physician or Chiropractor    Page 2-Insurer/TPA    Page 3-Employer    Page 4-Employee

## 2019-01-11 ENCOUNTER — OCCUPATIONAL MEDICINE (OUTPATIENT)
Dept: OCCUPATIONAL MEDICINE | Facility: CLINIC | Age: 39
End: 2019-01-11
Payer: COMMERCIAL

## 2019-01-11 VITALS
TEMPERATURE: 98.2 F | BODY MASS INDEX: 30.03 KG/M2 | OXYGEN SATURATION: 100 % | DIASTOLIC BLOOD PRESSURE: 86 MMHG | HEIGHT: 74 IN | SYSTOLIC BLOOD PRESSURE: 134 MMHG | WEIGHT: 234 LBS | HEART RATE: 82 BPM | RESPIRATION RATE: 16 BRPM

## 2019-01-11 DIAGNOSIS — S39.012D STRAIN OF LUMBAR REGION, SUBSEQUENT ENCOUNTER: ICD-10-CM

## 2019-01-11 PROCEDURE — 99203 OFFICE O/P NEW LOW 30 MIN: CPT | Performed by: PREVENTIVE MEDICINE

## 2019-01-11 NOTE — PROGRESS NOTES
"Subjective:      Jm Oglesby is a 38 y.o. male who presents with Follow-Up (WC DOI: 12/19/18 back -same-)      DOI: 12/19/18: 39 yo male presents for lower back pain.   Patient was bent down to  an empty box and as he was extending upwards he felt a sudden dull pain in the low back.  He was seen in urgent care several times, given NSAIDs, muscle relaxers, work restrictions and referral to physical therapy.  Patient states that overall lower back pain is about the same as last visit.  He denies radiating pain, numbness, tingling.  Denies prior major back injuries.  Taking ibuprofen and Flexeril for relief she does seem to help somewhat.  Pain worse with bending and twisting.     HPI    ROS  ROS: All systems were reviewed on intake form, form was reviewed and signed. See scanned documents in media. Pertinent positives and negatives included in HPI.    PMH: No pertinent past medical history to this problem  MEDS: Medications were reviewed in Epic  ALLERGIES:   Allergies   Allergen Reactions   • Amoxicillin    • Vicodin [Hydrocodone-Acetaminophen] Nausea     Nausea,vomiting,itching     SOCHX: Works as front ed assistant at [employer]  FH: No pertinent family history to this problem     Objective:     /86 (BP Location: Left arm, Patient Position: Sitting)   Pulse 82   Temp 36.8 °C (98.2 °F) (Temporal)   Resp 16   Ht 1.88 m (6' 2\")   Wt 106.1 kg (234 lb)   SpO2 100%   BMI 30.04 kg/m²      Physical Exam    Lumbar: No gross deformity.  Diffuse tenderness to palpation bilateral paraspinal musculature.  Reflexes intact.  Straight leg test negative.  Strength intact.       Assessment/Plan:     1. Strain of lumbar region, subsequent encounter  Begin physical therapy   Continue ibuprofen and Flexeril as prescribed   Restricted duty   Follow-up 3 weeks      "

## 2019-01-11 NOTE — LETTER
[address],   [address] [address]  Phone:  [phone] - Fax:  [phone]   [facility] Progress Report and Disability Certification  Date of Service: 1/11/2019   No Show:  No  Date / Time of Next Visit: 2/5/2019@9:55AM   Claim Information   Patient Name: Jm Oglesby  Claim Number:     Employer: [employer]  Date of Injury: 12/19/2018     Insurer / TPA: Mark [place]  ID / SSN:     Occupation:   Diagnosis: The encounter diagnosis was Strain of lumbar region, subsequent encounter.    Medical Information   Related to Industrial Injury? Yes    Subjective Complaints:  DOI: 12/19/18: 39 yo male presents for lower back pain.   Patient was bent down to  an empty box and as he was extending upwards he felt a sudden dull pain in the low back.  He was seen in urgent care several times, given NSAIDs, muscle relaxers, work restrictions and referral to physical therapy.  Patient states that overall lower back pain is about the same as last visit.  He denies radiating pain, numbness, tingling.  Denies prior major back injuries.  Taking ibuprofen and Flexeril for relief she does seem to help somewhat.  Pain worse with bending and twisting.   Objective Findings: Lumbar: No gross deformity.  Diffuse tenderness to palpation bilateral paraspinal musculature.  Reflexes intact.  Straight leg test negative.  Strength intact.   Pre-Existing Condition(s):     Assessment:   Condition Same    Status: Additional Care Required  Permanent Disability:No    Plan:      Diagnostics:      Comments:  Begin physical therapy  Continue ibuprofen and Flexeril as prescribed  Restricted duty  Follow-up 3 weeks    Disability Information   Status: Released to Restricted Duty    From:  1/11/2019  Through: 2/5/2019 Restrictions are: Temporary   Physical Restrictions   Sitting:    Standing:    Stooping:  < or = to 1 hr/day Bending:  < or  = to 1 hr/day   Squatting:    Walking:    Climbing:    Pushing:      Pulling:    Other:    Reaching Above Shoulder (L):   Reaching Above Shoulder (R):       Reaching Below Shoulder (L):    Reaching Below Shoulder (R):      Not to exceed Weight Limits   Carrying(hrs):   Weight Limit(lb): < or = to 10 pounds Lifting(hrs):   Weight  Limit(lb): < or = to 10 pounds   Comments:      Repetitive Actions   Hands: i.e. Fine Manipulations from Grasping:     Feet: i.e. Operating Foot Controls:     Driving / Operate Machinery:     Physician Name: Edy De Luna D.O. Physician Signature: EDY Nation D.O. e-Signature: Dr. Jonathan Olivas, Medical Director   Clinic Name / Location: [address],   [address] [address] Clinic Phone Number: Dept: [phone]   Appointment Time: 8:30 Am Visit Start Time: 8:17 AM   Check-In Time:  8:15 Am Visit Discharge Time:  8:47AM   Original-Treating Physician or Chiropractor    Page 2-Insurer/TPA    Page 3-Employer    Page 4-Employee

## 2019-01-16 ENCOUNTER — PHYSICAL THERAPY (OUTPATIENT)
Dept: PHYSICAL THERAPY | Facility: REHABILITATION | Age: 39
End: 2019-01-16
Attending: FAMILY MEDICINE
Payer: COMMERCIAL

## 2019-01-16 DIAGNOSIS — S39.012A LOW BACK STRAIN, INITIAL ENCOUNTER: ICD-10-CM

## 2019-01-16 PROCEDURE — 97161 PT EVAL LOW COMPLEX 20 MIN: CPT

## 2019-01-16 PROCEDURE — 97012 MECHANICAL TRACTION THERAPY: CPT

## 2019-01-16 ASSESSMENT — ENCOUNTER SYMPTOMS
QUALITY: ACHING
PAIN SCALE: 3
PAIN SCALE AT HIGHEST: 7
PAIN SCALE AT LOWEST: 2
QUALITY: TIGHTNESS
QUALITY: DIFFUSE

## 2019-01-16 NOTE — OP THERAPY EVALUATION
Outpatient Physical Therapy  INITIAL EVALUATION    [facility] Physical Therapy [address].  [address]  [place] [address]  Phone:  [phone]  Fax:  [phone]    Date of Evaluation: 2019    Patient: Jm Oglesby  YOB: 1980  MRN: [id number]     Referring Provider: Jenn Cm M.D.  [address]  [address]  [place] [address]   Referring Diagnosis Low back strain, initial encounter [S39.012A]     Time Calculation  Start time: 1015  Stop time: 1115 Time Calculation (min): 60 minutes     Physical Therapy Occurrence Codes    Date of onset of impairment:  18   Date physical therapy care plan established or reviewed:  19   Date physical therapy treatment started:  19          Chief Complaint: Back Problem and Back Injury    Visit Diagnoses     ICD-10-CM   1. Low back strain, initial encounter S39.012A         Subjective   History of Present Illness:     Date of onset:  2018    History of chief complaint:  Pt was working at [employer] and bent over to get an empty box and felt a pain in his back that progressively got worse.   No n/t , denies any bowel or bladder or saddle N/T.     Pt had acute back pain last labor day and he was in a spasm and bad pain for 10 days.     Pain:     Current pain rating:  3    At best pain ratin    At worst pain ratin    Location:  Central lumbar pain,    Quality:  Aching, diffuse and tightness    Aggravating factors:  Bending, twisting, reaching, sitting. Extension is the worst. Left hip flexion provokes a pop. Having trouble sleeping through the night.     Relieving factors:  Changing positions, heating pad, NSAIDS, mm relaxor, side lying, supine.     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     Hand Dominance:     Hand dominance:  Right    Diagnostic Tests:     X-ray: normal      Treatments:     Treatments to date:  Heat and NSAIDS.     Patient Goals:     Patient goals for therapy:  Return to regular, work out, be active, sleep through the night.       Past Medical History:   Diagnosis Date   • Hypertension     Borderline no meds     Past Surgical History:   Procedure Laterality Date   • OTHER      orbital plate r/t trauma /wrist   • OTHER ORTHOPEDIC SURGERY         Precautions:       Objective   Observation and functional movement:  Pt sits in mildly flexed posture with mild Kyphotic T/S.     Range of motion and strength:    Strength is within functional limits.    Lumbar extension approx 20degrees with sharp pain L3/4 region. Flexion to toes with mild diffuse low back pain.     Prone press ups stiff but improve with repetition.     Sensation and reflexes:     Sensation is intact.      Reflexes are normal and symmetrical.    Palpation and joint mobility:     PA's to L3,4 hypomobile with local pain. Tightness in lower T/S pvm's.     Special tests:      Negative neural tension tests. Manual traction decreases dull aching.             Therapeutic Treatments and Modalities:     1. Mechanical Traction (CPT 47273), 100#/50# 60/20 with P    Therapeutic Treatment and Modalities Summary: Pt instructed to start prone press ups, and standing extension as tolerated. Also discussed importance of posture and to avoid flexion for a few days and to use good body mechanics.     Time-based treatments/modalities:          Assessment, Response and Plan:   Impairments: abnormal ADL function, abnormal or restricted ROM, activity intolerance, difficulty performing job, lacks appropriate home exercise program, limited mobility and pain with function    Assessment details:  Mr. Oglesby is a pleasant 38 year old male 1 month s/p lumbar injury with residual low back pain. Pt will benefit from skilled  PT to address pain and dysfunction.   Prognosis: good    Goals:   Short Term Goals:   Pt able to demonstrate good posture and body mechanics.   Pt independent with daily core exercises and foam roller stretching for t/s.     Short term goal time span:  1-2 weeks      Long Term Goals:    Pt able to sleep through the night.  Pt able to return to regular work duty.   Pt able to go back to the gym .  RMQ to improve from 45 to 15.   Long term goal time span:  2-4 weeks    Plan:   Therapy options:  Physical therapy treatment to continue  Planned therapy interventions:  E Stim Unattended (CPT 92200), Therapeutic Exercise (CPT 80328), Manual Therapy (CPT 03987) and Mechanical Traction (CPT 70959)  Other planned therapy interventions:  Functional dry needling  Frequency:  2x week  Duration in weeks:  3  Discussed with:  Patient      Functional Limitations and Severity Modifiers  Nikolai Micah Low Back Pain and Disability Score: 45.83     Referring provider co-signature:  I have reviewed this plan of care and my co-signature certifies the need for services.  Certification Dates:   From 1-16-19    To 2-16-19    Physician Signature: ________________________________ Date: ______________

## 2019-01-18 ENCOUNTER — PHYSICAL THERAPY (OUTPATIENT)
Dept: PHYSICAL THERAPY | Facility: REHABILITATION | Age: 39
End: 2019-01-18
Attending: FAMILY MEDICINE
Payer: COMMERCIAL

## 2019-01-18 DIAGNOSIS — S39.012A LOW BACK STRAIN, INITIAL ENCOUNTER: ICD-10-CM

## 2019-01-18 PROCEDURE — 97014 ELECTRIC STIMULATION THERAPY: CPT

## 2019-01-18 NOTE — OP THERAPY DAILY TREATMENT
Outpatient Physical Therapy  DAILY TREATMENT     [facility] Physical Therapy [place] [address]  [address]  [place] [address]  Phone:  [phone]  Fax:  [phone]    Date: 01/18/2019    Patient: Jm Oglesby  YOB: 1980  MRN: [id number]     Time Calculation  Start time: 0945  Stop time: 1023 Time Calculation (min): 38 minutes     Chief Complaint: back pain  Visit #: 2    SUBJECTIVE:  Pt had more pain with traction but he's able to press up farther.     OBJECTIVE:      Therapeutic Treatments and Modalities:     1. Manual Therapy (CPT 02878), PA's to L3,4, overpressure with PA's, STW to L/S pvm's    2. E Stim Unattended (CPT 22008), IFC and MHP to L/S    Therapeutic Treatment and Modalities Summary: Taped L/S to limit flexion.    Time-based treatments/modalities:      ASSESSMENT:   Pt has improved extension in prone, however wt bearing extension still painful and limited.    PLAN/RECOMMENDATIONS:   Plan for treatment: therapy treatment to continue next visit.  Planned interventions for next visit: E-stim unattended (CPT 93181), manual therapy (CPT 98752) and therapeutic exercise (CPT 43463).

## 2019-01-22 ENCOUNTER — PHYSICAL THERAPY (OUTPATIENT)
Dept: PHYSICAL THERAPY | Facility: REHABILITATION | Age: 39
End: 2019-01-22
Attending: FAMILY MEDICINE
Payer: COMMERCIAL

## 2019-01-22 DIAGNOSIS — S39.012A LOW BACK STRAIN, INITIAL ENCOUNTER: ICD-10-CM

## 2019-01-22 PROCEDURE — 97012 MECHANICAL TRACTION THERAPY: CPT

## 2019-01-22 NOTE — OP THERAPY DAILY TREATMENT
Outpatient Physical Therapy  DAILY TREATMENT     [facility] Physical Therapy [address]  [address]  [place] [address]  Phone:  [phone]  Fax:  [phone]    Date: 01/22/2019    Patient: Jm Oglesby  YOB: 1980  MRN: [id number]     Time Calculation  Start time: 0915  Stop time: 0958 Time Calculation (min): 43 minutes     Chief Complaint: back pain  Visit #: 3    SUBJECTIVE:  Pt states he's back is feeling better overall. he's using his own TENS unit and it seems to help.    OBJECTIVE:    Lumbar extension improved both prone and wt bearing.      Therapeutic Exercises (CPT 27959):     1. Posterior chain work: bridge with ball,     2. Bridge with HS curls, 2 x 12    3. Bridge with hip extension    4. Ball walk outs with hip extension, 10x each    Therapeutic Treatments and Modalities:     1. Manual Therapy (CPT 26274), PA's to L3-5, press ups with overpressure    2. Mechanical Traction (CPT 28365), prone 100#/50# 60/20 with mhp    Time-based treatments/modalities:  Manual therapy minutes (CPT 52579): 10 minutes  Therapeutic exercise minutes (CPT 12915): 17 minutes       ASSESSMENT:   pt's making steady improvement with treatment.     PLAN/RECOMMENDATIONS:   Plan for treatment: therapy treatment to continue next visit.  Planned interventions for next visit: E-stim unattended (CPT 27732), mechanical traction (CPT 37196) and therapeutic exercise (CPT 70140).

## 2019-01-25 ENCOUNTER — PHYSICAL THERAPY (OUTPATIENT)
Dept: PHYSICAL THERAPY | Facility: REHABILITATION | Age: 39
End: 2019-01-25
Attending: FAMILY MEDICINE
Payer: COMMERCIAL

## 2019-01-25 DIAGNOSIS — S39.012A LOW BACK STRAIN, INITIAL ENCOUNTER: ICD-10-CM

## 2019-01-25 PROCEDURE — 97014 ELECTRIC STIMULATION THERAPY: CPT

## 2019-01-25 PROCEDURE — 97110 THERAPEUTIC EXERCISES: CPT

## 2019-01-25 NOTE — OP THERAPY DAILY TREATMENT
Outpatient Physical Therapy  DAILY TREATMENT     [facility] Physical Therapy [address].  [address]  [place] [address]  Phone:  [phone]  Fax:  [phone]    Date: 01/25/2019    Patient: Jm Oglesby  YOB: 1980  MRN: [id number]     Time Calculation  Start time: 0815  Stop time: 0905 Time Calculation (min): 50 minutes     Chief Complaint: back pain  Visit #: 4    SUBJECTIVE:  Some soreness after core ex.  OBJECTIVE:  L thorax MD  Lumbar extension improved both prone and wt bearing.      Therapeutic Exercises (CPT 99923):     4. Ball walk outs with hip extension and hip flexion, 10x each      Therapeutic Exercise Summary: 4x4 rotation  -supine arms out with straight leg & bent knee trunk rotation w/ focus on scap retraction  -prone rotation--bottom up rotation  -lumbar lock t/s rotation with #2 band assist--hep  Prone ext over ball  Russian to t/s multifidi 5/5/ x 15' w/ mhp in SEIL  luzma against wall      Time-based treatments/modalities:  Therapeutic exercise minutes (CPT 20470): 26 minutes       ASSESSMENT:   Decreasing pain--noted L thorax mobility dysfunction w/ fair psot chain endurance    PLAN/RECOMMENDATIONS:   Progress matrix and core level II

## 2019-01-29 ENCOUNTER — PHYSICAL THERAPY (OUTPATIENT)
Dept: PHYSICAL THERAPY | Facility: REHABILITATION | Age: 39
End: 2019-01-29
Attending: FAMILY MEDICINE
Payer: COMMERCIAL

## 2019-01-29 DIAGNOSIS — S39.012A LOW BACK STRAIN, INITIAL ENCOUNTER: ICD-10-CM

## 2019-01-29 PROCEDURE — 97014 ELECTRIC STIMULATION THERAPY: CPT

## 2019-01-29 NOTE — OP THERAPY DAILY TREATMENT
Outpatient Physical Therapy  DAILY TREATMENT     [facility] Physical Therapy [address].  [address]  [place] [address]  Phone:  [phone]  Fax:  [phone]    Date: 01/29/2019    Patient: Jm Oglesby  YOB: 1980  MRN: [id number]     Time Calculation  Start time: 0715  Stop time: 0802 Time Calculation (min): 47 minutes     Chief Complaint: low back pain  Visit #: 5    SUBJECTIVE:  Pt states he's feeling better overall.     OBJECTIVE:      Therapeutic Exercises (CPT 70530):     1. Foam roller: alt flexion, pec stretch , snow angels    2. [place] on the wall.     3. Side planks, 45 seconds    4. Core exercises on roller    Therapeutic Treatments and Modalities:     1. E Stim Unattended (CPT 68174), russian stim to upper L/S pvm's    Time-based treatments/modalities:  Therapeutic exercise minutes (CPT 43288): 31 minutes         ASSESSMENT:   Pt is doing excellent overall with minimal pain. He is responding well to treatment.     PLAN/RECOMMENDATIONS:   Plan for treatment: therapy treatment to continue next visit.  Planned interventions for next visit: E-stim unattended (CPT 51217), manual therapy (CPT 40092), mechanical traction (CPT 62693) and therapeutic exercise (CPT 58279).  Will DC to a [facility] after next visit.

## 2019-02-01 ENCOUNTER — PHYSICAL THERAPY (OUTPATIENT)
Dept: PHYSICAL THERAPY | Facility: REHABILITATION | Age: 39
End: 2019-02-01
Attending: FAMILY MEDICINE
Payer: COMMERCIAL

## 2019-02-01 DIAGNOSIS — S39.012A LOW BACK STRAIN, INITIAL ENCOUNTER: ICD-10-CM

## 2019-02-01 PROCEDURE — 97012 MECHANICAL TRACTION THERAPY: CPT

## 2019-02-01 PROCEDURE — 97110 THERAPEUTIC EXERCISES: CPT

## 2019-02-01 NOTE — OP THERAPY DAILY TREATMENT
Outpatient Physical Therapy  DAILY TREATMENT     [facility] Physical [address].  [address]  [place] [address]  Phone:  [phone]  Fax:  [phone]    Date: 02/01/2019    Patient: Jm Oglesby  YOB: 1980  MRN: [id number]     Time Calculation  Start time: 0815  Stop time: 0901 Time Calculation (min): 46 minutes     Chief Complaint: low back pain  Visit #: 6    SUBJECTIVE:  Pt has a little pain and stiffness today, but it's mild: 2/10.    OBJECTIVE:      Therapeutic Exercises (CPT 83104):     1. Foam roller: alt flexion, pec stretch , snow angels    2. [place] on the wall.     3. Side planks, 45 seconds    4. Core exercises on roller    5. Reviewed HEP    Therapeutic Treatments and Modalities:     1. Mechanical Traction (CPT 92074), 100#/60# 60/20 with MHP prone    Time-based treatments/modalities:  Therapeutic exercise minutes (CPT 68903): 30 minutes       ASSESSMENT:   Pt has made significant improvement with treatment. See progress note.     PLAN/RECOMMENDATIONS:   Plan for treatment: refer patient back to MD.  Planned interventions for next visit: pt may return for more visits per MD.

## 2019-02-01 NOTE — OP THERAPY PROGRESS SUMMARY
Outpatient Physical Therapy  PROGRESS SUMMARY NOTE      [facility] Physical Therapy [address].  [address]  [place] [address]  Phone:  [phone]  Fax:  [phone]    Date of Visit: 02/01/2019    Patient: Jm Oglesby  YOB: 1980  MRN: [id number]     Referring Provider: Jenn Cm M.D.  [address]  [address]  [place] [address]   Referring Diagnosis Strain of muscle, fascia and tendon of lower back, initial encounter [S39.012A]     Visit Diagnoses     ICD-10-CM   1. Low back strain, initial encounter S39.012A       Rehab Potential: excellent    Physical Therapy Occurrence Codes    Date of onset of impairment:  12/19/18   Date physical therapy care plan established or reviewed:  1/16/19   Date physical therapy treatment started:  1/16/19          Functional Limitations and Severity Modifiers  Nikolai Micah Low Back Pain and Disability Score: 12.5               Progress Summary Details  Jm has made nice improvement with treatment. RMQ improved from 45 to 12.   HIs ROM has normalized.     Long Term Goals:  MET  Pt able to sleep through the night.   Pt able to go back to the gym .  RMQ to improve from 45 to 15.       Referring provider co-signature:  I have reviewed this plan of care and my co-signature certifies the need for services.    Physician Signature: ________________________________ Date: ______________

## 2019-02-05 ENCOUNTER — OCCUPATIONAL MEDICINE (OUTPATIENT)
Dept: OCCUPATIONAL MEDICINE | Facility: CLINIC | Age: 39
End: 2019-02-05
Payer: COMMERCIAL

## 2019-02-05 VITALS
OXYGEN SATURATION: 95 % | HEIGHT: 74 IN | DIASTOLIC BLOOD PRESSURE: 102 MMHG | BODY MASS INDEX: 29.13 KG/M2 | TEMPERATURE: 98.5 F | HEART RATE: 75 BPM | SYSTOLIC BLOOD PRESSURE: 142 MMHG | WEIGHT: 227 LBS

## 2019-02-05 DIAGNOSIS — S39.012D STRAIN OF LUMBAR REGION, SUBSEQUENT ENCOUNTER: ICD-10-CM

## 2019-02-05 PROCEDURE — 99212 OFFICE O/P EST SF 10 MIN: CPT | Performed by: PREVENTIVE MEDICINE

## 2019-02-05 RX ORDER — IBUPROFEN 400 MG/1
400 TABLET ORAL EVERY 6 HOURS PRN
COMMUNITY

## 2019-02-05 NOTE — PROGRESS NOTES
"Subjective:      Jm Oglesby is a 38 y.o. male who presents with Follow-Up (DOi 12/19/18- back- better )      DOI: 12/19/18: 37 yo male presents for lower back pain.   Patient was bent down to  an empty box and as he was extending upwards he felt a sudden dull pain in the low back.  Patient states overall he feels about 92% improved.  He has completed physical therapy with good improvement.  He states he has been doing the home exercises help.  Notes some discomfort in mid/lower back with certain movements but otherwise feels improved.  Feels okay doing trial of full duty.     HPI    ROS       Objective:     /102   Pulse 75   Temp 36.9 °C (98.5 °F)   Ht 1.88 m (6' 2\")   Wt 103 kg (227 lb)   SpO2 95%   BMI 29.15 kg/m²      Physical Exam    Lumbar: No gross deformity.  No tenderness to palpation.  Full range of motion.  Reflexes intact.       Assessment/Plan:     1. Strain of lumbar region, subsequent encounter  OTC ibuprofen as needed  Full duty  Follow-up 1 week      "

## 2019-02-05 NOTE — LETTER
[address],   [address] [place] [address]  Phone:  [phone] - Fax:  [phone]   [facility] Progress Report and Disability Certification  Date of Service: 2/5/2019   No Show:  No  Date / Time of Next Visit: 2/14/2019 @ 1:30 PM   Claim Information   Patient Name: Jm Oglesby  Claim Number:     Employer: [employer]  Date of Injury: 12/19/2018     Insurer / TPA: [employer] [place]  ID / SSN:     Occupation:   Diagnosis: The encounter diagnosis was Strain of lumbar region, subsequent encounter.    Medical Information   Related to Industrial Injury? Yes    Subjective Complaints:  DOI: 12/19/18: 37 yo male presents for lower back pain.   Patient was bent down to  an empty box and as he was extending upwards he felt a sudden dull pain in the low back.  Patient states overall he feels about 92% improved.  He has completed physical therapy with good improvement.  He states he has been doing the home exercises help.  Notes some discomfort in mid/lower back with certain movements but otherwise feels improved.  Feels okay doing trial of full duty.   Objective Findings: Lumbar: No gross deformity.  No tenderness to palpation.  Full range of motion.  Reflexes intact.   Pre-Existing Condition(s):     Assessment:   Condition Improved    Status: Additional Care Required  Permanent Disability:No    Plan:      Diagnostics:      Comments:  OTC ibuprofen as needed  Full duty  Follow-up 1 week    Disability Information   Status: Released to Full Duty    From:  2/5/2019  Through: 2/14/2019 Restrictions are:     Physical Restrictions   Sitting:    Standing:    Stooping:    Bending:      Squatting:    Walking:    Climbing:    Pushing:      Pulling:    Other:    Reaching Above Shoulder (L):   Reaching Above Shoulder (R):       Reaching Below Shoulder (L):    Reaching Below Shoulder (R):      Not to exceed Weight Limits      Carrying(hrs):   Weight Limit(lb):   Lifting(hrs):   Weight  Limit(lb):     Comments:      Repetitive Actions   Hands: i.e. Fine Manipulations from Grasping:     Feet: i.e. Operating Foot Controls:     Driving / Operate Machinery:     Physician Name: Edy De Luna D.O. Physician Signature: vinodSignTAYLEDY RAHMAN D.O. e-Signature: Dr. Jonathan Olivas, Medical Director   Clinic Name / Location: [address],   [address] [address] Clinic Phone Number: Dept: [phone]   Appointment Time: 9:55 Am Visit Start Time: 9:49 AM   Check-In Time:  9:48 Am Visit Discharge Time:  10:37 AM   Original-Treating Physician or Chiropractor    Page 2-Insurer/TPA    Page 3-Employer    Page 4-Employee

## 2019-02-14 ENCOUNTER — OCCUPATIONAL MEDICINE (OUTPATIENT)
Dept: OCCUPATIONAL MEDICINE | Facility: CLINIC | Age: 39
End: 2019-02-14
Payer: COMMERCIAL

## 2019-02-14 VITALS
BODY MASS INDEX: 28.23 KG/M2 | HEIGHT: 74 IN | DIASTOLIC BLOOD PRESSURE: 72 MMHG | SYSTOLIC BLOOD PRESSURE: 128 MMHG | OXYGEN SATURATION: 99 % | HEART RATE: 81 BPM | WEIGHT: 220 LBS | TEMPERATURE: 99 F

## 2019-02-14 DIAGNOSIS — S39.012D STRAIN OF LUMBAR REGION, SUBSEQUENT ENCOUNTER: ICD-10-CM

## 2019-02-14 PROCEDURE — 99212 OFFICE O/P EST SF 10 MIN: CPT | Performed by: PREVENTIVE MEDICINE

## 2019-02-14 NOTE — LETTER
[address],   [address] [place] [address]  Phone:  [phone] - Fax:  [phone]   [facility] Progress Report and Disability Certification  Date of Service: 2/14/2019   No Show:  No  Date / Time of Next Visit:  Release from care   Claim Information   Patient Name: Jm Oglesby  Claim Number:     Employer: [employer]  Date of Injury: 12/19/2018     Insurer / TPA: [employer] [place]  ID / SSN:     Occupation:   Diagnosis: The encounter diagnosis was Strain of lumbar region, subsequent encounter.    Medical Information   Related to Industrial Injury? Yes    Subjective Complaints:  DOI: 12/19/18: 39 yo male presents for lower back pain.   Patient was bent down to  an empty box and as he was extending upwards he felt a sudden dull pain in the low back.  Patient states he continues to have a little bit of soreness in the morning with being in one position for prolonged periods of time otherwise has no pain.  Has been working full duty without difficulty.   Objective Findings: Lumbar: No gross deformity.   Full range of motion.     Pre-Existing Condition(s):     Assessment:   Condition Improved    Status: Discharged /  MMI  Permanent Disability:No    Plan:      Diagnostics:      Comments:  Released from care  Full duty  Follow-up as needed    Disability Information   Status: Released to Full Duty    From:  2/14/2019  Through:   Restrictions are:     Physical Restrictions   Sitting:    Standing:    Stooping:    Bending:      Squatting:    Walking:    Climbing:    Pushing:      Pulling:    Other:    Reaching Above Shoulder (L):   Reaching Above Shoulder (R):       Reaching Below Shoulder (L):    Reaching Below Shoulder (R):      Not to exceed Weight Limits   Carrying(hrs):   Weight Limit(lb):   Lifting(hrs):   Weight  Limit(lb):     Comments:      Repetitive Actions   Hands: i.e. Fine Manipulations from Grasping:      Feet: i.e. Operating Foot Controls:     Driving / Operate Machinery:     Physician Name: Edy Moreira D.O. Physician Signature: [email or web address] MOREIRA D.O. e-Signature: Dr. Jonathan Olivas, Medical Director   Clinic Name / Location: [address],   [address]  [place] [address] Clinic Phone Number: Dept: [phone]   Appointment Time: 1:30 Pm Visit Start Time: 1:25 PM   Check-In Time:  1:23 Pm Visit Discharge Time:  1:46PM   Original-Treating Physician or Chiropractor    Page 2-Insurer/TPA    Page 3-Employer    Page 4-Employee

## 2019-09-04 ENCOUNTER — TELEPHONE (OUTPATIENT)
Dept: PHYSICAL THERAPY | Facility: MEDICAL CENTER | Age: 39
End: 2019-09-04

## 2019-09-04 NOTE — OP THERAPY DISCHARGE SUMMARY
Outpatient Physical Therapy  DISCHARGE SUMMARY NOTE      [facility] Outpatient Physical Therapy  [address]  [place] [address]  Phone:  [phone]  Fax:  [phone]    Date of Visit: 09/04/2019    Patient: Jm Oglesby  YOB: 1980  MRN: [id number]     Referring Provider: Edy De Luna D.O.   Referring Diagnosis Low back strain     Physical Therapy Occurrence Codes    Date of onset of impairment:  12/19/18   Date physical therapy care plan established or reviewed:  1/16/19   Date physical therapy treatment started:  1/16/19            Your patient is being discharged from Physical Therapy with the following comments:   · Goals partially met    Jm was seen for 6 sessions for low back pain and he was responding well. Pt was released to full duty and from my care. See progress note on 2-1-19. DC                Poppy Vences, PT    Date: 9/4/2019

## 2022-11-16 NOTE — PROGRESS NOTES
"Subjective:      Jm Oglesby is a 38 y.o. male who presents with Follow-Up (DOi 12/19/18- low back- better )      DOI: 12/19/18: 37 yo male presents for lower back pain.   Patient was bent down to  an empty box and as he was extending upwards he felt a sudden dull pain in the low back.  Patient states he continues to have a little bit of soreness in the morning with being in one position for prolonged periods of time otherwise has no pain.  Has been working full duty without difficulty.     HPI    ROS       Objective:     /72   Pulse 81   Temp 37.2 °C (99 °F)   Ht 1.88 m (6' 2\")   Wt 99.8 kg (220 lb)   SpO2 99%   BMI 28.25 kg/m²      Physical Exam    Lumbar: No gross deformity.   Full range of motion.         Assessment/Plan:     1. Strain of lumbar region, subsequent encounter  Released from care  Full duty  Follow-up as needed      " yes Patient